# Patient Record
Sex: MALE
[De-identification: names, ages, dates, MRNs, and addresses within clinical notes are randomized per-mention and may not be internally consistent; named-entity substitution may affect disease eponyms.]

---

## 2019-03-05 ENCOUNTER — HOSPITAL ENCOUNTER (EMERGENCY)
Dept: HOSPITAL 5 - ED | Age: 39
Discharge: HOME | End: 2019-03-05
Payer: COMMERCIAL

## 2019-03-05 VITALS — DIASTOLIC BLOOD PRESSURE: 66 MMHG | SYSTOLIC BLOOD PRESSURE: 130 MMHG

## 2019-03-05 DIAGNOSIS — J45.909: ICD-10-CM

## 2019-03-05 DIAGNOSIS — F17.200: ICD-10-CM

## 2019-03-05 DIAGNOSIS — K02.9: Primary | ICD-10-CM

## 2019-03-05 PROCEDURE — 99282 EMERGENCY DEPT VISIT SF MDM: CPT

## 2019-03-05 NOTE — EMERGENCY DEPARTMENT REPORT
Blank Doc





- Documentation


Documentation: 





This is a 38 y.o. male that presents to ED with toothache for several months.  

Patient given amoxicillin a couple times with no improvement of symptoms.  

Current smoker 1 ppd.





Fast track for further evaluation.

## 2019-03-05 NOTE — EMERGENCY DEPARTMENT REPORT
ED ENT HPI





- General


Chief complaint: Dental/Oral


Stated complaint: TOOTHACHE


Time Seen by Provider: 19 17:12


Source: patient


Mode of arrival: Ambulatory


Limitations: No Limitations





- History of Present Illness


Initial comments: 


Patient is a 38-year-old white male who presents for bilateral dental caries to 

#1 and #16 history of infected dental carries  unable to see a dentist , 

completed a course of amoxicillin 2 weeks ago and returned 1 week ago, pain is 

4/10- aching exacerbated by hot and cold stimuli , pt is tolerating po intake 

there is no throat or ear pain no face or dental swelling , pain is relieved by 

tylenol #3 





MD complaint: tooth pain


Onset/Timin


-: week(s)


Location: tooth # (1&16)


Severity: moderate


Severity scale (0 -10): 5


Quality: aching


Consistency: constant


Improves with: NSAID


Worsens with: eating


Context- Dental: history of dental caries, poor dental care


Associated Symptoms: toothache





- Related Data


                                  Previous Rx's











 Medication  Instructions  Recorded  Last Taken  Type


 


Acetaminophen/Codeine [Tylenol 1 tab PO Q6H PRN #12 tab 19 Unknown Rx





/Codeine # 3 tab]    


 


Amoxicillin/Potassium Clav 1 each PO BID 10 Days #20 tablet 19 Unknown Rx





[Augmentin 875-125 Tablet]    


 


Chlorhexidine Mouthwash [Peridex] 15 ml MM BID #1 bottle 19 Unknown Rx











                                    Allergies











Allergy/AdvReac Type Severity Reaction Status Date / Time


 


No Known Allergies Allergy   Verified 19 17:03














ED Dental HPI





- General


Chief complaint: Dental/Oral


Stated complaint: TOOTHACHE


Time Seen by Provider: 19 17:12


Source: patient


Mode of arrival: Ambulatory


Limitations: No Limitations





- Related Data


                                  Previous Rx's











 Medication  Instructions  Recorded  Last Taken  Type


 


Acetaminophen/Codeine [Tylenol 1 tab PO Q6H PRN #12 tab 19 Unknown Rx





/Codeine # 3 tab]    


 


Amoxicillin/Potassium Clav 1 each PO BID 10 Days #20 tablet 19 Unknown Rx





[Augmentin 875-125 Tablet]    


 


Chlorhexidine Mouthwash [Peridex] 15 ml MM BID #1 bottle 19 Unknown Rx











                                    Allergies











Allergy/AdvReac Type Severity Reaction Status Date / Time


 


No Known Allergies Allergy   Verified 19 17:03














ED Review of Systems


ROS: 


Stated complaint: TOOTHACHE


Other details as noted in HPI





Constitutional: denies: chills, fever


Eyes: denies: eye pain, eye discharge, vision change


ENT: dental pain.  denies: ear pain, throat pain


Respiratory: denies: cough, shortness of breath, wheezing


Cardiovascular: denies: chest pain, palpitations


Endocrine: no symptoms reported


Gastrointestinal: denies: abdominal pain, nausea, diarrhea


Genitourinary: denies: urgency, dysuria


Musculoskeletal: denies: back pain, joint swelling, arthralgia


Skin: denies: rash, lesions


Neurological: denies: headache, weakness, paresthesias


Psychiatric: denies: anxiety, depression


Hematological/Lymphatic: denies: easy bleeding, easy bruising





ED Past Medical Hx





- Past Medical History


Hx Congestive Heart Failure: No


Hx Diabetes: No


Hx Asthma: Yes


Hx COPD: No





- Surgical History


Past Surgical History?: No


Additional Surgical History: STELLA





- Social History


Smoking Status: Current Every Day Smoker


Substance Use Type: None





- Medications


Home Medications: 


                                Home Medications











 Medication  Instructions  Recorded  Confirmed  Last Taken  Type


 


Acetaminophen/Codeine [Tylenol 1 tab PO Q6H PRN #12 tab 19  Unknown Rx





/Codeine # 3 tab]     


 


Amoxicillin/Potassium Clav 1 each PO BID 10 Days #20 tablet 19  Unknown Rx





[Augmentin 875-125 Tablet]     


 


Chlorhexidine Mouthwash [Peridex] 15 ml MM BID #1 bottle 19  Unknown Rx














ED Physical Exam





- General


Limitations: No Limitations


General appearance: alert, in no apparent distress





- Head


Head exam: Present: atraumatic, normocephalic





- Eye


Eye exam: Present: normal appearance, PERRL, EOMI


Pupils: Present: normal accommodation





- ENT


ENT exam: Present: normal orophraynx, mucous membranes moist, TM's normal 

bilaterally, normal external ear exam





- Expanded ENT Exam


  ** Expanded


Teeth exam: Present: dental caries, fractured tooth #, dental tenderness # (1& 

16)


Throat exam: Positive: normal inspection, other (uvula midline no swelling no 

trimus no stridor )





- Neck


Neck exam: Present: normal inspection, full ROM.  Absent: tenderness, 

lymphadenopathy, thyromegaly





- Respiratory


Respiratory exam: Present: normal lung sounds bilaterally.  Absent: respiratory 

distress, wheezes, stridor, chest wall tenderness





- Cardiovascular


Cardiovascular Exam: Present: regular rate, normal rhythm.  Absent: systolic 

murmur, diastolic murmur, rubs, gallop





- GI/Abdominal


GI/Abdominal exam: Present: soft, normal bowel sounds.  Absent: tenderness, 

mass, hernia





- Rectal


Rectal exam: Present: deferred





- Extremities Exam


Extremities exam: Present: normal inspection





- Back Exam


Back exam: Present: normal inspection, full ROM.  Absent: tenderness





- Neurological Exam


Neurological exam: Present: alert, oriented X3, CN II-XII intact, normal gait, 

reflexes normal





- Psychiatric


Psychiatric exam: Present: normal affect, normal mood





- Skin


Skin exam: Present: warm, dry, intact, normal color.  Absent: rash





ED Course





                                   Vital Signs











  19





  17:13 19:56


 


Temperature 98 F 


 


Pulse Rate 85 


 


Respiratory 16 15





Rate  


 


Blood Pressure 130/66 


 


O2 Sat by Pulse 98 





Oximetry  














ED Medical Decision Making





- Medical Decision Making


this is infected dental carries plan augmentin peridex t3 follow up with 

[facility] in 2-3 days , pt verbalized agreement and 

understanding of huang, pt will follow up with [facility] tomorrow. 





Critical care attestation.: 


If time is entered above; I have spent that time in minutes in the direct care 

of this critically ill patient, excluding procedure time.








ED Disposition


Clinical Impression: 


 Infected dental carries





Disposition: - TO HOME OR SELFCARE


Is pt being admited?: No


Does the pt Need Aspirin: No


Condition: Stable


Instructions:  Dental Caries (ED)


Prescriptions: 


Acetaminophen/Codeine [Tylenol /Codeine # 3 tab] 1 tab PO Q6H PRN #12 tab


 PRN Reason: pain


Amoxicillin/Potassium Clav [Augmentin 875-125 Tablet] 1 each PO BID 10 Days #20 

tablet


Chlorhexidine Mouthwash [Peridex] 15 ml MM BID #1 bottle


Referrals: 


[facility], [place] [Primary Care Provider] - 3-5 Days


Forms:  Work/School Release Form(ED)


Time of Disposition: 20:31

## 2019-06-11 ENCOUNTER — HOSPITAL ENCOUNTER (EMERGENCY)
Dept: HOSPITAL 5 - ED | Age: 39
Discharge: LEFT BEFORE DISCHARGE | End: 2019-06-11
Payer: SELF-PAY

## 2019-06-11 VITALS — DIASTOLIC BLOOD PRESSURE: 73 MMHG | SYSTOLIC BLOOD PRESSURE: 118 MMHG

## 2019-06-11 DIAGNOSIS — R41.0: Primary | ICD-10-CM

## 2019-06-11 DIAGNOSIS — J45.909: ICD-10-CM

## 2019-06-11 LAB
ALBUMIN SERPL-MCNC: 4.5 G/DL (ref 3.9–5)
ALT SERPL-CCNC: 14 UNITS/L (ref 7–56)
BASOPHILS # (AUTO): 0.1 K/MM3 (ref 0–0.1)
BASOPHILS NFR BLD AUTO: 0.9 % (ref 0–1.8)
BILIRUB DIRECT SERPL-MCNC: < 0.2 MG/DL (ref 0–0.2)
BUN SERPL-MCNC: 13 MG/DL (ref 9–20)
BUN/CREAT SERPL: 19 %
CALCIUM SERPL-MCNC: 8.9 MG/DL (ref 8.4–10.2)
EOSINOPHIL # BLD AUTO: 0.2 K/MM3 (ref 0–0.4)
EOSINOPHIL NFR BLD AUTO: 1.5 % (ref 0–4.3)
HCT VFR BLD CALC: 44.9 % (ref 35.5–45.6)
HEMOLYSIS INDEX: 13
HGB BLD-MCNC: 15.1 GM/DL (ref 11.8–15.2)
LYMPHOCYTES # BLD AUTO: 2.3 K/MM3 (ref 1.2–5.4)
LYMPHOCYTES NFR BLD AUTO: 19.1 % (ref 13.4–35)
MCHC RBC AUTO-ENTMCNC: 34 % (ref 32–34)
MCV RBC AUTO: 93 FL (ref 84–94)
MONOCYTES # (AUTO): 1 K/MM3 (ref 0–0.8)
MONOCYTES % (AUTO): 8.2 % (ref 0–7.3)
PLATELET # BLD: 319 K/MM3 (ref 140–440)
RBC # BLD AUTO: 4.84 M/MM3 (ref 3.65–5.03)

## 2019-06-11 PROCEDURE — 93010 ELECTROCARDIOGRAM REPORT: CPT

## 2019-06-11 PROCEDURE — 36415 COLL VENOUS BLD VENIPUNCTURE: CPT

## 2019-06-11 PROCEDURE — 93005 ELECTROCARDIOGRAM TRACING: CPT

## 2019-06-11 PROCEDURE — 80076 HEPATIC FUNCTION PANEL: CPT

## 2019-06-11 PROCEDURE — 85025 COMPLETE CBC W/AUTO DIFF WBC: CPT

## 2019-06-11 PROCEDURE — 80048 BASIC METABOLIC PNL TOTAL CA: CPT

## 2019-06-11 NOTE — EMERGENCY DEPARTMENT REPORT
ED General Adult HPI





- General


Chief complaint: Altered Mental Status


Stated complaint: AMS


Time Seen by Provider: 06/11/19 10:34


Source: EMS


Mode of arrival: Stretcher


Limitations: No Limitations





- History of Present Illness


Initial comments: 





Patient presents to the emergency department and this referral to mental status.

 Patient states that this morning he was slightly confused but now feels better.

 Patient states he might have fallen and hit his head but is not quite sure.  

Patient denies chest pain, , headache.


-: Sudden


Radiation: non-radiation


Severity scale (0 -10): 0


Consistency: now resolved


Improves with: none


Worsens with: none


Associated Symptoms: denies other symptoms


Treatments Prior to Arrival: none





- Related Data


                                  Previous Rx's











 Medication  Instructions  Recorded  Last Taken  Type


 


Acetaminophen/Codeine [Tylenol 1 tab PO Q6H PRN #12 tab 03/05/19 Unknown Rx





/Codeine # 3 tab]    


 


Amoxicillin/Potassium Clav 1 each PO BID 10 Days #20 tablet 03/05/19 Unknown Rx





[Augmentin 875-125 Tablet]    


 


Chlorhexidine Mouthwash [Peridex] 15 ml MM BID #1 bottle 03/05/19 Unknown Rx











                                    Allergies











Allergy/AdvReac Type Severity Reaction Status Date / Time


 


No Known Allergies Allergy   Verified 03/05/19 17:03














ED Review of Systems


ROS: 


Stated complaint: AMS


Other details as noted in HPI





Constitutional: denies: chills, fever


Eyes: denies: eye pain, eye discharge, vision change


ENT: denies: ear pain, throat pain


Respiratory: denies: cough, shortness of breath, wheezing


Cardiovascular: denies: chest pain, palpitations


Endocrine: no symptoms reported


Gastrointestinal: denies: abdominal pain, nausea, diarrhea


Genitourinary: denies: urgency, dysuria


Musculoskeletal: denies: back pain, joint swelling, arthralgia


Skin: denies: rash, lesions


Neurological: denies: headache, weakness, paresthesias


Psychiatric: denies: anxiety, depression


Hematological/Lymphatic: denies: easy bleeding, easy bruising





ED Past Medical Hx





- Past Medical History


Hx Congestive Heart Failure: No


Hx Diabetes: No


Hx Asthma: Yes


Hx COPD: No





- Surgical History


Additional Surgical History: STELLA





- Social History


Smoking Status: Never Smoker


Substance Use Type: None





- Medications


Home Medications: 


                                Home Medications











 Medication  Instructions  Recorded  Confirmed  Last Taken  Type


 


Acetaminophen/Codeine [Tylenol 1 tab PO Q6H PRN #12 tab 03/05/19  Unknown Rx





/Codeine # 3 tab]     


 


Amoxicillin/Potassium Clav 1 each PO BID 10 Days #20 tablet 03/05/19  Unknown Rx





[Augmentin 875-125 Tablet]     


 


Chlorhexidine Mouthwash [Peridex] 15 ml MM BID #1 bottle 03/05/19  Unknown Rx














ED Physical Exam





- General


Limitations: No Limitations


General appearance: alert, in no apparent distress





- Head


Head exam: Present: atraumatic, normocephalic





- Eye


Eye exam: Present: normal appearance, PERRL, EOMI, other (pinpoint pupils)





- ENT


ENT exam: Present: mucous membranes moist





- Neck


Neck exam: Present: normal inspection





- Respiratory


Respiratory exam: Present: normal lung sounds bilaterally.  Absent: respiratory 

distress





- Cardiovascular


Cardiovascular Exam: Present: regular rate, normal rhythm.  Absent: systolic 

murmur, diastolic murmur, rubs, gallop





- GI/Abdominal


GI/Abdominal exam: Present: soft, normal bowel sounds.  Absent: distended, 

tenderness





- Rectal


Rectal exam: Present: deferred





- Extremities Exam


Extremities exam: Present: normal inspection





- Back Exam


Back exam: Present: normal inspection





- Neurological Exam


Neurological exam: Present: alert, oriented X3, CN II-XII intact.  Absent: motor

sensory deficit





- Psychiatric


Psychiatric exam: Present: normal affect, normal mood





- Skin


Skin exam: Present: warm, dry, intact, normal color.  Absent: rash





ED Course


                                   Vital Signs











  06/11/19





  10:00


 


Temperature 97.8 F


 


Pulse Rate 84


 


Respiratory 18





Rate 


 


Blood Pressure 112/73


 


O2 Sat by Pulse 96





Oximetry 














ED Medical Decision Making





- Lab Data


Result diagrams: 


                                 06/11/19 10:23





                                 06/11/19 10:23








                                   Lab Results











  06/11/19 06/11/19 06/11/19 Range/Units





  10:23 10:23 10:23 


 


WBC  12.0 H    (4.5-11.0)  K/mm3


 


RBC  4.84    (3.65-5.03)  M/mm3


 


Hgb  15.1    (11.8-15.2)  gm/dl


 


Hct  44.9    (35.5-45.6)  %


 


MCV  93    (84-94)  fl


 


MCH  31    (28-32)  pg


 


MCHC  34    (32-34)  %


 


RDW  13.9    (13.2-15.2)  %


 


Plt Count  319    (140-440)  K/mm3


 


Lymph % (Auto)  19.1    (13.4-35.0)  %


 


Mono % (Auto)  8.2 H    (0.0-7.3)  %


 


Eos % (Auto)  1.5    (0.0-4.3)  %


 


Baso % (Auto)  0.9    (0.0-1.8)  %


 


Lymph #  2.3    (1.2-5.4)  K/mm3


 


Mono #  1.0 H    (0.0-0.8)  K/mm3


 


Eos #  0.2    (0.0-0.4)  K/mm3


 


Baso #  0.1    (0.0-0.1)  K/mm3


 


Seg Neutrophils %  70.3 H    (40.0-70.0)  %


 


Seg Neutrophils #  8.4 H    (1.8-7.7)  K/mm3


 


Sodium   140   (137-145)  mmol/L


 


Potassium   4.1   (3.6-5.0)  mmol/L


 


Chloride   103.2   ()  mmol/L


 


Carbon Dioxide   24   (22-30)  mmol/L


 


Anion Gap   17   mmol/L


 


BUN   13   (9-20)  mg/dL


 


Creatinine   0.7 L   (0.8-1.5)  mg/dL


 


Estimated GFR   > 60   ml/min


 


BUN/Creatinine Ratio   19   %


 


Glucose   106 H   ()  mg/dL


 


Calcium   8.9   (8.4-10.2)  mg/dL


 


Total Bilirubin    0.50  (0.1-1.2)  mg/dL


 


Direct Bilirubin    < 0.2  (0-0.2)  mg/dL


 


AST    18  (5-40)  units/L


 


ALT    14  (7-56)  units/L


 


Alkaline Phosphatase    126  ()  units/L


 


Total Protein    7.4  (6.3-8.2)  g/dL


 


Albumin    4.5  (3.9-5)  g/dL


 


Albumin/Globulin Ratio    1.6  %














- Medical Decision Making





The patient was reevaluated at 12:45 PM and he states that he feels like his 

normal self and wants to leave.  His girlfriend is in room with him and states 

he is at his baseline.  Patient states he needs to leave because he needs to 

take her to work.  I discussed with patient that he should wait until the workup

was completed but he stated he was going to leave anyway


Patient signed out AGAINST MEDICAL ADVICE


Critical care attestation.: 


If time is entered above; I have spent that time in minutes in the direct care 

of this critically ill patient, excluding procedure time.








ED Disposition


Clinical Impression: 


 Confusion





Disposition: DC-07 LEFT AGAINST MED ADVICE


Is pt being admited?: No


Does the pt Need Aspirin: No


Condition: Stable


Referrals: 


[facility] [place] [Primary Care Provider] - 3-5 Days


Time of Disposition: 12:54

## 2019-11-18 ENCOUNTER — HOSPITAL ENCOUNTER (EMERGENCY)
Dept: HOSPITAL 5 - ED | Age: 39
Discharge: HOME | End: 2019-11-18
Payer: SELF-PAY

## 2019-11-18 VITALS — SYSTOLIC BLOOD PRESSURE: 111 MMHG | DIASTOLIC BLOOD PRESSURE: 72 MMHG

## 2019-11-18 DIAGNOSIS — F15.10: ICD-10-CM

## 2019-11-18 DIAGNOSIS — F19.10: Primary | ICD-10-CM

## 2019-11-18 LAB
ALBUMIN SERPL-MCNC: 4.6 G/DL (ref 3.9–5)
ALT SERPL-CCNC: 25 UNITS/L (ref 7–56)
APTT BLD: 30.9 SEC. (ref 24.2–36.6)
BASOPHILS # (AUTO): 0.1 K/MM3 (ref 0–0.1)
BASOPHILS NFR BLD AUTO: 1.7 % (ref 0–1.8)
BENZODIAZEPINES SCREEN,URINE: (no result)
BILIRUB UR QL STRIP: (no result)
BLOOD UR QL VISUAL: (no result)
BUN SERPL-MCNC: 11 MG/DL (ref 9–20)
BUN/CREAT SERPL: 16 %
CALCIUM SERPL-MCNC: 8.9 MG/DL (ref 8.4–10.2)
EOSINOPHIL # BLD AUTO: 0.1 K/MM3 (ref 0–0.4)
EOSINOPHIL NFR BLD AUTO: 1.5 % (ref 0–4.3)
HCT VFR BLD CALC: 42.5 % (ref 35.5–45.6)
HEMOLYSIS INDEX: 109
HGB BLD-MCNC: 14.1 GM/DL (ref 11.8–15.2)
INR PPP: 1.24 (ref 0.87–1.13)
LYMPHOCYTES # BLD AUTO: 2.2 K/MM3 (ref 1.2–5.4)
LYMPHOCYTES NFR BLD AUTO: 31.1 % (ref 13.4–35)
MCHC RBC AUTO-ENTMCNC: 33 % (ref 32–34)
MCV RBC AUTO: 93 FL (ref 84–94)
METHADONE SCREEN,URINE: (no result)
MONOCYTES # (AUTO): 0.9 K/MM3 (ref 0–0.8)
MONOCYTES % (AUTO): 11.9 % (ref 0–7.3)
MUCOUS THREADS #/AREA URNS HPF: (no result) /HPF
OPIATE SCREEN,URINE: (no result)
PH UR STRIP: 5 [PH] (ref 5–7)
PLATELET # BLD: 320 K/MM3 (ref 140–440)
RBC # BLD AUTO: 4.57 M/MM3 (ref 3.65–5.03)
RBC #/AREA URNS HPF: 9 /HPF (ref 0–6)
UROBILINOGEN UR-MCNC: 2 MG/DL (ref ?–2)
WBC #/AREA URNS HPF: 3 /HPF (ref 0–6)

## 2019-11-18 PROCEDURE — 81001 URINALYSIS AUTO W/SCOPE: CPT

## 2019-11-18 PROCEDURE — 70450 CT HEAD/BRAIN W/O DYE: CPT

## 2019-11-18 PROCEDURE — 82140 ASSAY OF AMMONIA: CPT

## 2019-11-18 PROCEDURE — 84484 ASSAY OF TROPONIN QUANT: CPT

## 2019-11-18 PROCEDURE — G0480 DRUG TEST DEF 1-7 CLASSES: HCPCS

## 2019-11-18 PROCEDURE — 82553 CREATINE MB FRACTION: CPT

## 2019-11-18 PROCEDURE — 80307 DRUG TEST PRSMV CHEM ANLYZR: CPT

## 2019-11-18 PROCEDURE — 85610 PROTHROMBIN TIME: CPT

## 2019-11-18 PROCEDURE — 80320 DRUG SCREEN QUANTALCOHOLS: CPT

## 2019-11-18 PROCEDURE — 80053 COMPREHEN METABOLIC PANEL: CPT

## 2019-11-18 PROCEDURE — 82962 GLUCOSE BLOOD TEST: CPT

## 2019-11-18 PROCEDURE — 82550 ASSAY OF CK (CPK): CPT

## 2019-11-18 PROCEDURE — 85025 COMPLETE CBC W/AUTO DIFF WBC: CPT

## 2019-11-18 PROCEDURE — 85730 THROMBOPLASTIN TIME PARTIAL: CPT

## 2019-11-18 PROCEDURE — 36415 COLL VENOUS BLD VENIPUNCTURE: CPT

## 2019-11-18 NOTE — CAT SCAN REPORT
CT BRAIN: 11/18/2019



INDICATION / CLINICAL INFORMATION:

altered mental status.



COMPARISON: 

None available.



FINDINGS:



BRAIN/INTRACRANIAL STRUCTURES: Unenhanced CT images of the brain demonstrate no evidence of acute int
racranial abnormality.



Ventricles and sulci are normal in size and shape.



There is no evidence of hemorrhage or mass. There are no abnormal extra-axial fluid collections.









EXTRACRANIAL STRUCTURES: Unremarkable.







IMPRESSION:

 Negative unenhanced CT of the brain.









All CT scans at this location are performed using dose reduction to ALARA by means of automated expos
ure control.



Signer Name: Farooq Lee MD 

Signed: 11/18/2019 2:19 PM

 Workstation Name: [id number]

## 2019-11-18 NOTE — EMERGENCY DEPARTMENT REPORT
HPI





- General


Time Seen by Provider: 19 08:08





- HPI


HPI: 





Room 21 --> 1





The pt is an adult male dropped off by private vehicle with a cc of overdose. 

The pt was reported to have taken GHB. The pt is altered and appears obtunded 

but becomes hyperactive but nonverbal when sternally rubbed. Pt then goes back 

to sleep. 





ED Past Medical Hx





- Past Medical History


Previous Medical History?: No





- Surgical History


Past Surgical History?: No





- Family History


Family history: no significant





- Social History


Smoking Status: Unknown if ever smoked


Substance Use Type: None





ED Review of Systems


ROS: 


Stated complaint: OVERDOSE


Other details as noted in HPI





Comment: Unobtainable due to pts medical conditions





Physical Exam





- Physical Exam


Physical Exam: 





GEN: WD WN M lying on stretcher obtunded. With deep sternal rub pt becomes 

hyperagitated but then calms down and goes back to sleep


HEENT: pupils 2mm bilat, NCAT


NECK: Trachea midline, no stridor


CV: rrr no m/r/g


PULM: CTA bilat, no resp distress


ABD: S/NT/ND +BS


SKIN: No diaphoresis


NEURO: pt appears obtunded.  With deep sternal rub pt becomes hyperagitated but 

then calms down and goes back to sleep. moves all 4 ext


MS: no deformity





ED Course





- Reevaluation(s)


Reevaluation #1: 





19 14:29


Patient now awake and oriented.  Patient denies complaints.





ED Medical Decision Making





- Lab Data


Result diagrams: 


                                 19 08:14





                                 19 08:14





                                Laboratory Tests











  19





  08:14 08:14 08:14


 


WBC  7.2  


 


RBC  4.57  


 


Hgb  14.1  


 


Hct  42.5  


 


MCV  93  


 


MCH  31  


 


MCHC  33  


 


RDW  15.0  


 


Plt Count  320  


 


Lymph % (Auto)  31.1  


 


Mono % (Auto)  11.9 H  


 


Eos % (Auto)  1.5  


 


Baso % (Auto)  1.7  


 


Lymph #  2.2  


 


Mono #  0.9 H  


 


Eos #  0.1  


 


Baso #  0.1  


 


Seg Neutrophils %  53.8  


 


Seg Neutrophils #  3.9  


 


PT   15.5 H 


 


INR   1.24 H 


 


APTT   30.9 


 


Sodium    142


 


Potassium    3.9


 


Chloride    105.5


 


Carbon Dioxide    19 L


 


Anion Gap    21


 


BUN    11


 


Creatinine    0.7 L


 


Estimated GFR    > 60


 


BUN/Creatinine Ratio    16


 


Glucose    106 H


 


POC Glucose   


 


Calcium    8.9


 


Total Bilirubin    0.40


 


AST    37


 


ALT    25


 


Alkaline Phosphatase    83


 


Ammonia   


 


Total Creatine Kinase    577 H


 


CK-MB (CK-2)    8.1 H


 


CK-MB (CK-2) Rel Index    1.4


 


Troponin T    < 0.010


 


Total Protein    7.3


 


Albumin    4.6


 


Albumin/Globulin Ratio    1.7


 


Urine Color   


 


Urine Turbidity   


 


Urine pH   


 


Ur Specific Gravity   


 


Urine Protein   


 


Urine Glucose (UA)   


 


Urine Ketones   


 


Urine Blood   


 


Urine Nitrite   


 


Urine Bilirubin   


 


Urine Urobilinogen   


 


Ur Leukocyte Esterase   


 


Urine WBC (Auto)   


 


Urine RBC (Auto)   


 


Urine Mucus   


 


Urine Opiates Screen   


 


Urine Methadone Screen   


 


Ur Barbiturates Screen   


 


Ur Phencyclidine Scrn   


 


Ur Amphetamines Screen   


 


U Benzodiazepines Scrn   


 


Urine Cocaine Screen   


 


U Marijuana (THC) Screen   


 


Drugs of Abuse Note   


 


Plasma/Serum Alcohol   














  19





  08:14 08:14 08:26


 


WBC   


 


RBC   


 


Hgb   


 


Hct   


 


MCV   


 


MCH   


 


MCHC   


 


RDW   


 


Plt Count   


 


Lymph % (Auto)   


 


Mono % (Auto)   


 


Eos % (Auto)   


 


Baso % (Auto)   


 


Lymph #   


 


Mono #   


 


Eos #   


 


Baso #   


 


Seg Neutrophils %   


 


Seg Neutrophils #   


 


PT   


 


INR   


 


APTT   


 


Sodium   


 


Potassium   


 


Chloride   


 


Carbon Dioxide   


 


Anion Gap   


 


BUN   


 


Creatinine   


 


Estimated GFR   


 


BUN/Creatinine Ratio   


 


Glucose   


 


POC Glucose    81


 


Calcium   


 


Total Bilirubin   


 


AST   


 


ALT   


 


Alkaline Phosphatase   


 


Ammonia  47.0  


 


Total Creatine Kinase   


 


CK-MB (CK-2)   


 


CK-MB (CK-2) Rel Index   


 


Troponin T   


 


Total Protein   


 


Albumin   


 


Albumin/Globulin Ratio   


 


Urine Color   


 


Urine Turbidity   


 


Urine pH   


 


Ur Specific Gravity   


 


Urine Protein   


 


Urine Glucose (UA)   


 


Urine Ketones   


 


Urine Blood   


 


Urine Nitrite   


 


Urine Bilirubin   


 


Urine Urobilinogen   


 


Ur Leukocyte Esterase   


 


Urine WBC (Auto)   


 


Urine RBC (Auto)   


 


Urine Mucus   


 


Urine Opiates Screen   


 


Urine Methadone Screen   


 


Ur Barbiturates Screen   


 


Ur Phencyclidine Scrn   


 


Ur Amphetamines Screen   


 


U Benzodiazepines Scrn   


 


Urine Cocaine Screen   


 


U Marijuana (THC) Screen   


 


Drugs of Abuse Note   


 


Plasma/Serum Alcohol   < 0.01 














  19





  09:52 09:52


 


WBC  


 


RBC  


 


Hgb  


 


Hct  


 


MCV  


 


MCH  


 


MCHC  


 


RDW  


 


Plt Count  


 


Lymph % (Auto)  


 


Mono % (Auto)  


 


Eos % (Auto)  


 


Baso % (Auto)  


 


Lymph #  


 


Mono #  


 


Eos #  


 


Baso #  


 


Seg Neutrophils %  


 


Seg Neutrophils #  


 


PT  


 


INR  


 


APTT  


 


Sodium  


 


Potassium  


 


Chloride  


 


Carbon Dioxide  


 


Anion Gap  


 


BUN  


 


Creatinine  


 


Estimated GFR  


 


BUN/Creatinine Ratio  


 


Glucose  


 


POC Glucose  


 


Calcium  


 


Total Bilirubin  


 


AST  


 


ALT  


 


Alkaline Phosphatase  


 


Ammonia  


 


Total Creatine Kinase  


 


CK-MB (CK-2)  


 


CK-MB (CK-2) Rel Index  


 


Troponin T  


 


Total Protein  


 


Albumin  


 


Albumin/Globulin Ratio  


 


Urine Color  Akiko 


 


Urine Turbidity  Clear 


 


Urine pH  5.0 


 


Ur Specific Gravity  1.028 


 


Urine Protein  30 mg/dl 


 


Urine Glucose (UA)  Neg 


 


Urine Ketones  20 


 


Urine Blood  Neg 


 


Urine Nitrite  Neg 


 


Urine Bilirubin  Neg 


 


Urine Urobilinogen  2.0 


 


Ur Leukocyte Esterase  Neg 


 


Urine WBC (Auto)  3.0 


 


Urine RBC (Auto)  9.0 


 


Urine Mucus  3+ 


 


Urine Opiates Screen   Presumptive negative


 


Urine Methadone Screen   Presumptive negative


 


Ur Barbiturates Screen   Presumptive negative


 


Ur Phencyclidine Scrn   Presumptive negative


 


Ur Amphetamines Screen   Presumptive positive


 


U Benzodiazepines Scrn   Presumptive negative


 


Urine Cocaine Screen   Presumptive negative


 


U Marijuana (THC) Screen   Presumptive positive


 


Drugs of Abuse Note   Disclamer


 


Plasma/Serum Alcohol  














- Radiology Data


Radiology results: report reviewed (CT head), image reviewed (CT head)





[address] [address] Cat

Scan Report Signed Patient: MARKEL PIERCE MR#: [id number] : 1980 

Acct:[id number] Age/Sex: 38 / M ADM Date: 19 Loc: ED Attending Dr: 

Ordering Physician: HINA LICEA MD Date of Service: 19 Procedure(s): CT 

head/brain wo con Accession Number(s): [id number] cc: HINA LICEA MD CT BRAIN: 

2019 INDICATION / CLINICAL INFORMATION: altered mental status. COMPARISON:

None available. FINDINGS: BRAIN/INTRACRANIAL STRUCTURES: Unenhanced CT images of

the brain demonstrate no evidence of acute intracranial abnormality. Ventricles 

and sulci are normal in size and shape. There is no evidence of hemorrhage or 

mass. There are no abnormal extra-axial fluid collections. EXTRACRANIAL 

STRUCTURES: Unremarkable. IMPRESSION: Negative unenhanced CT of the brain. All 

CT scans at this location are performed using dose reduction to ALARA by means 

of automated exposure control. Signer Name: Farooq Lee MD Signed: 2019 

2:19 PM Workstation Name: [id number] Transcribed By: AO Dictated By: Farooq Lee MD Electronically Authenticated By: Farooq Lee MD Signed Date/Time: 

19 1419 DD/DT: 19 1418 TD/TT: 





- Differential Diagnosis


drug overdose


Critical care attestation.: 


If time is entered above; I have spent that time in minutes in the direct care 

of this critically ill patient, excluding procedure time.








ED Disposition


Clinical Impression: 


 Gamma-hydroxybutyrate (GHB) use disorder, moderate, Amphetamine abuse





Disposition: DC-01 TO HOME OR SELFCARE


Is pt being admited?: No


Does the pt Need Aspirin: No


Condition: Stable


Instructions:  Polysubstance Abuse (ED)


Additional Instructions: 


Return to the emergency department should you develop worsening symptoms, 

inability to tolerate food or liquids, high fever or any other concerns


Referrals: 


[facility] [Primary Care Provider] - 3-5 Days


[facility] [Outside] - 3-5 Days


Time of Disposition: 14:28